# Patient Record
Sex: FEMALE | Race: WHITE | Employment: FULL TIME | URBAN - METROPOLITAN AREA
[De-identification: names, ages, dates, MRNs, and addresses within clinical notes are randomized per-mention and may not be internally consistent; named-entity substitution may affect disease eponyms.]

---

## 2017-03-03 ENCOUNTER — APPOINTMENT (OUTPATIENT)
Dept: OTHER | Facility: HOSPITAL | Age: 47
End: 2017-03-03
Attending: EMERGENCY MEDICINE

## 2017-08-08 ENCOUNTER — APPOINTMENT (OUTPATIENT)
Dept: OTHER | Facility: HOSPITAL | Age: 47
End: 2017-08-08
Attending: ORTHOPAEDIC SURGERY

## 2017-08-14 ENCOUNTER — APPOINTMENT (OUTPATIENT)
Dept: OTHER | Facility: HOSPITAL | Age: 47
End: 2017-08-14
Attending: FAMILY MEDICINE

## 2017-08-15 ENCOUNTER — APPOINTMENT (OUTPATIENT)
Dept: OTHER | Facility: HOSPITAL | Age: 47
End: 2017-08-15
Attending: ORTHOPAEDIC SURGERY

## 2020-03-19 ENCOUNTER — TELEPHONE (OUTPATIENT)
Dept: SURGERY | Facility: CLINIC | Age: 50
End: 2020-03-19

## 2020-03-19 ENCOUNTER — OFFICE VISIT (OUTPATIENT)
Dept: SURGERY | Facility: CLINIC | Age: 50
End: 2020-03-19
Payer: COMMERCIAL

## 2020-03-19 VITALS
OXYGEN SATURATION: 99 % | HEART RATE: 98 BPM | DIASTOLIC BLOOD PRESSURE: 86 MMHG | BODY MASS INDEX: 32.27 KG/M2 | HEIGHT: 64 IN | SYSTOLIC BLOOD PRESSURE: 128 MMHG | WEIGHT: 189 LBS

## 2020-03-19 DIAGNOSIS — R53.82 CHRONIC FATIGUE: ICD-10-CM

## 2020-03-19 DIAGNOSIS — R63.5 WEIGHT GAIN: ICD-10-CM

## 2020-03-19 DIAGNOSIS — E66.9 OBESITY (BMI 30-39.9): ICD-10-CM

## 2020-03-19 DIAGNOSIS — E03.9 ACQUIRED HYPOTHYROIDISM: ICD-10-CM

## 2020-03-19 DIAGNOSIS — R73.03 PRE-DIABETES: Primary | ICD-10-CM

## 2020-03-19 PROCEDURE — 99204 OFFICE O/P NEW MOD 45 MIN: CPT | Performed by: INTERNAL MEDICINE

## 2020-03-19 RX ORDER — FLUTICASONE PROPIONATE 0.05 %
CREAM (GRAM) TOPICAL AS NEEDED
COMMUNITY
Start: 2019-11-20

## 2020-03-19 RX ORDER — MOMETASONE FUROATE 200 UG/1
AEROSOL RESPIRATORY (INHALATION)
COMMUNITY
Start: 2019-10-24

## 2020-03-19 RX ORDER — FLUTICASONE FUROATE 200 UG/1
POWDER RESPIRATORY (INHALATION)
COMMUNITY
Start: 2020-01-23

## 2020-03-19 RX ORDER — LEVOTHYROXINE SODIUM 0.05 MG/1
TABLET ORAL
COMMUNITY
Start: 2020-01-29

## 2020-03-19 RX ORDER — CYCLOBENZAPRINE HCL 5 MG
TABLET ORAL AS NEEDED
COMMUNITY
Start: 2019-12-17

## 2020-03-19 NOTE — PROGRESS NOTES
[facility] Consultation Note       Patient:  Antoinette Black  :      4/15/1970  MRN:      [id number]    Referring Provider: Dr. Burden ref.  provider found       Chief Complaint:  Patient presents with:  Consult  Weight Management      S Activity      Alcohol use: Not Currently      Drug use: Never      Sexual activity: Not on file    Lifestyle      Physical activity:        Days per week: Not on file        Minutes per session: Not on file      Stress: Not on file    Relationships      So control strategies to reduce calorie intake, Identify triggers for eating and manage cues and Eat slowly and take 20 to 30 minutes to complete each meal    Exercise:  Walking  Has bike at home    ROS:  Constitutional: positive for fatigue  Respiratory: pos log.  2. Drink 48-64 ounces of non-caloric beverages per day. No fruit juices or regular soda. 3. Increase activity-upper body exercises, walk 10 minutes per day. 4. Increase fruit and vegetable servings to 5-6 per day.       A1C 6.2 (02/09/2020)    NILAYT

## 2020-04-07 ENCOUNTER — TELEPHONE (OUTPATIENT)
Dept: SURGERY | Facility: CLINIC | Age: 50
End: 2020-04-07

## 2020-04-07 NOTE — TELEPHONE ENCOUNTER
Jasiel Quach would like to increase the phentermine. She does not feel that it is helping with weight at the current dose.  Please advise

## 2020-04-08 ENCOUNTER — TELEPHONE (OUTPATIENT)
Dept: SURGERY | Facility: CLINIC | Age: 50
End: 2020-04-08

## 2020-04-08 DIAGNOSIS — Z51.81 ENCOUNTER FOR THERAPEUTIC DRUG MONITORING: Primary | ICD-10-CM

## 2020-04-08 NOTE — TELEPHONE ENCOUNTER
Spoke to patient    Has not lost any weight    Has been walking    Recommend she increases Lomaira to one tablet in the morning and one tablet at noon    She will call us back Friday with her thoughts

## 2020-04-10 ENCOUNTER — TELEPHONE (OUTPATIENT)
Dept: SURGERY | Facility: CLINIC | Age: 50
End: 2020-04-10

## 2020-04-10 RX ORDER — PHENTERMINE HYDROCHLORIDE 15 MG/1
15 CAPSULE ORAL EVERY MORNING
Qty: 30 CAPSULE | Refills: 1 | Status: SHIPPED | OUTPATIENT
Start: 2020-04-10 | End: 2020-06-17 | Stop reason: DRUGHIGH

## 2020-04-10 NOTE — TELEPHONE ENCOUNTER
Patient called to give you an update on phentermine,has not seen any change. would like to see if her dose could be increased

## 2020-04-30 ENCOUNTER — VIRTUAL PHONE E/M (OUTPATIENT)
Dept: SURGERY | Facility: CLINIC | Age: 50
End: 2020-04-30
Payer: COMMERCIAL

## 2020-04-30 VITALS — WEIGHT: 181 LBS | BODY MASS INDEX: 30.9 KG/M2 | HEIGHT: 64 IN

## 2020-04-30 DIAGNOSIS — R53.82 CHRONIC FATIGUE: ICD-10-CM

## 2020-04-30 DIAGNOSIS — E66.9 OBESITY (BMI 30-39.9): ICD-10-CM

## 2020-04-30 DIAGNOSIS — R63.2 INCREASED APPETITE: Primary | ICD-10-CM

## 2020-04-30 DIAGNOSIS — Z51.81 ENCOUNTER FOR THERAPEUTIC DRUG MONITORING: ICD-10-CM

## 2020-04-30 DIAGNOSIS — R73.03 PRE-DIABETES: ICD-10-CM

## 2020-04-30 PROCEDURE — 99213 OFFICE O/P EST LOW 20 MIN: CPT | Performed by: INTERNAL MEDICINE

## 2020-04-30 NOTE — PROGRESS NOTES
[facility], [address] [facility]  Dept: [phone]       Patient:  Sung Diaz  :      4/15/1970  MRN:      [id number]    Chief Complaint:  Patient pres Transportation needs:        Medical: Not on file        Non-medical: Not on file    Tobacco Use      Smoking status: Never Smoker      Smokeless tobacco: Never Used    Substance and Sexual Activity      Alcohol use: Not Currently      Drug use: Never Eat 3-4 cups of fresh fruits or vegetables daily    Behavior Modifications Reviewed and Discussed  Eat breakfast, Eat 3 meals per day, Plan meals in advance, Read nutrition labels, Drink 64 oz of water per day, Maintain a daily food journal, No drinking 30

## 2020-06-17 ENCOUNTER — OFFICE VISIT (OUTPATIENT)
Dept: SURGERY | Facility: CLINIC | Age: 50
End: 2020-06-17
Payer: COMMERCIAL

## 2020-06-17 VITALS
BODY MASS INDEX: 32.1 KG/M2 | SYSTOLIC BLOOD PRESSURE: 126 MMHG | HEART RATE: 98 BPM | HEIGHT: 64 IN | DIASTOLIC BLOOD PRESSURE: 76 MMHG | WEIGHT: 188 LBS | OXYGEN SATURATION: 99 %

## 2020-06-17 DIAGNOSIS — R73.03 PRE-DIABETES: Primary | ICD-10-CM

## 2020-06-17 DIAGNOSIS — Z51.81 ENCOUNTER FOR THERAPEUTIC DRUG MONITORING: ICD-10-CM

## 2020-06-17 DIAGNOSIS — R63.2 INCREASED APPETITE: ICD-10-CM

## 2020-06-17 DIAGNOSIS — E66.9 OBESITY (BMI 30-39.9): ICD-10-CM

## 2020-06-17 DIAGNOSIS — R53.82 CHRONIC FATIGUE: ICD-10-CM

## 2020-06-17 PROCEDURE — 99214 OFFICE O/P EST MOD 30 MIN: CPT | Performed by: INTERNAL MEDICINE

## 2020-06-17 RX ORDER — PHENTERMINE HYDROCHLORIDE 30 MG/1
30 CAPSULE ORAL EVERY MORNING
Qty: 30 CAPSULE | Refills: 1 | Status: SHIPPED | OUTPATIENT
Start: 2020-06-17

## 2020-06-17 RX ORDER — TOPIRAMATE 25 MG/1
25 TABLET ORAL EVERY EVENING
Qty: 30 TABLET | Refills: 2 | Status: SHIPPED | OUTPATIENT
Start: 2020-06-17

## 2020-06-17 NOTE — PROGRESS NOTES
[facility], [address] [facility]  Dept: [phone]       Patient:  Mela Mcintosh  :      4/15/1970  MRN:      [id number]    Chief Complaint:  Patient pres insecurity:        Worry: Not on file        Inability: Not on file      Transportation needs:        Medical: Not on file        Non-medical: Not on file    Tobacco Use      Smoking status: Never Smoker      Smokeless tobacco: Never Used    Substance and to 100 gms per day, Eat 100-200 calories within 1 hour of waking  and Eat 3-4 cups of fresh fruits or vegetables daily    Behavior Modifications Reviewed and Discussed  Eat breakfast, Eat 3 meals per day, Plan meals in advance, Read nutrition labels, Drink a food log. 2. Drink 48-64 ounces of non-caloric beverages per day. No fruit juices or regular soda. 3. Increase activity-upper body exercises, walk 10 minutes per day. 4. Increase fruit and vegetable servings to 5-6 per day.       Tolerating phentermine

## 2023-05-17 LAB
AMB EXT CHOL/HDL RATIO: 4.2
AMB EXT CHOLESTEROL, TOTAL: 231 MG/DL
AMB EXT HDL CHOLESTEROL: 55 MG/DL
AMB EXT HGBA1C: 6.6 %
AMB EXT LDL CHOLESTEROL, DIRECT: 142 MG/DL
AMB EXT TRIGLYCERIDES: 170 MG/DL
AMB EXT TSH: 1.89 MIU/ML
AMB EXT VLDL: 34 MG/DL

## 2023-12-19 ENCOUNTER — OFFICE VISIT (OUTPATIENT)
Facility: CLINIC | Age: 53
End: 2023-12-19
Payer: COMMERCIAL

## 2023-12-19 VITALS
OXYGEN SATURATION: 98 % | HEIGHT: 64 IN | WEIGHT: 195 LBS | HEART RATE: 103 BPM | DIASTOLIC BLOOD PRESSURE: 82 MMHG | SYSTOLIC BLOOD PRESSURE: 128 MMHG | BODY MASS INDEX: 33.29 KG/M2

## 2023-12-19 DIAGNOSIS — E03.9 HYPOTHYROIDISM, UNSPECIFIED TYPE: ICD-10-CM

## 2023-12-19 DIAGNOSIS — E11.9 TYPE 2 DIABETES MELLITUS WITHOUT COMPLICATION, WITHOUT LONG-TERM CURRENT USE OF INSULIN (HCC): ICD-10-CM

## 2023-12-19 DIAGNOSIS — Z00.00 ENCOUNTER FOR ROUTINE ADULT HEALTH EXAMINATION WITHOUT ABNORMAL FINDINGS: Primary | ICD-10-CM

## 2023-12-19 DIAGNOSIS — N95.1 HOT FLASHES DUE TO MENOPAUSE: ICD-10-CM

## 2023-12-19 PROBLEM — R63.2 INCREASED APPETITE: Status: RESOLVED | Noted: 2020-04-30 | Resolved: 2023-12-19

## 2023-12-19 PROCEDURE — 90715 TDAP VACCINE 7 YRS/> IM: CPT | Performed by: FAMILY MEDICINE

## 2023-12-19 PROCEDURE — 90471 IMMUNIZATION ADMIN: CPT | Performed by: FAMILY MEDICINE

## 2023-12-19 PROCEDURE — 99386 PREV VISIT NEW AGE 40-64: CPT | Performed by: FAMILY MEDICINE

## 2023-12-19 PROCEDURE — 99203 OFFICE O/P NEW LOW 30 MIN: CPT | Performed by: FAMILY MEDICINE

## 2023-12-19 PROCEDURE — 3079F DIAST BP 80-89 MM HG: CPT | Performed by: FAMILY MEDICINE

## 2023-12-19 PROCEDURE — 3008F BODY MASS INDEX DOCD: CPT | Performed by: FAMILY MEDICINE

## 2023-12-19 PROCEDURE — 3074F SYST BP LT 130 MM HG: CPT | Performed by: FAMILY MEDICINE

## 2023-12-19 RX ORDER — TIRZEPATIDE 5 MG/.5ML
INJECTION, SOLUTION SUBCUTANEOUS
COMMUNITY
End: 2023-12-20 | Stop reason: DRUGHIGH

## 2023-12-19 RX ORDER — FLUTICASONE PROPIONATE 220 UG/1
AEROSOL, METERED RESPIRATORY (INHALATION) 2 TIMES DAILY
COMMUNITY
End: 2023-12-22

## 2023-12-19 RX ORDER — YOHIMBE BARK 500 MG
500 CAPSULE ORAL 3 TIMES DAILY
COMMUNITY

## 2023-12-20 RX ORDER — TIRZEPATIDE 7.5 MG/.5ML
7.5 INJECTION, SOLUTION SUBCUTANEOUS WEEKLY
Qty: 2 ML | Refills: 0 | Status: SHIPPED | OUTPATIENT
Start: 2023-12-20

## 2023-12-22 RX ORDER — FLUTICASONE PROPIONATE 220 UG/1
1 AEROSOL, METERED RESPIRATORY (INHALATION) 2 TIMES DAILY
Qty: 3 EACH | Refills: 3 | Status: SHIPPED | OUTPATIENT
Start: 2023-12-22 | End: 2023-12-22

## 2023-12-22 RX ORDER — FLUTICASONE PROPIONATE 220 UG/1
1 AEROSOL, METERED RESPIRATORY (INHALATION) 2 TIMES DAILY
Qty: 3 EACH | Refills: 3 | Status: SHIPPED | OUTPATIENT
Start: 2023-12-22

## 2023-12-22 NOTE — TELEPHONE ENCOUNTER
Refill passed per [facility] Brian protocol. Rx listed as external/Pt reported    Requested Prescriptions   Pending Prescriptions Disp Refills    Fluticasone Propionate  MCG/ACT Inhalation Aerosol 3 each 3     Sig: Inhale into the lungs 2 (two) times daily. Inhale into the lungs 2 (two) times daily.        Asthma & COPD Medication Protocol Passed - 12/22/2023 12:28 PM        Passed - In person appointment or virtual visit in the past 6 mos or appointment in next 3 mos     Recent Outpatient Visits              3 days ago Encounter for routine adult health examination without abnormal findings    Adri Hidalgo [address]    Office Visit    3 years ago Shira Christy MD    Office Visit    3 years ago Increased appetite    Maikol Pérez MD    [facility] [employer] [facility]    3 years ago Pre-diabetes    Maikol Pérez MD    Office Visit          Future Appointments         Provider Department Appt Notes    In 2 months Kelton Yepez, [address], [facility] [place] Visits              3 days ago Encounter for routine adult health examination without abnormal findings    [address] [place] [address]    Office Visit    3 years ago Pre-diabetes    Maikol Pérez MD    Office Visit    3 years ago Increased appetite    [address], [address] [facility] [address], Kristian Pugh MD    [facility]    3 years ago Shira Christy MD    Office Visit            Future Appointments         Provider Department Appt Notes    In 2 months Kelton Yepez, [facility]

## 2023-12-22 NOTE — TELEPHONE ENCOUNTER
Patient is calling requesting refill on the following medication.     Fluticasone Propionate  MCG/ACT Inhalation Aerosol         [id number] - [facility], [phone], [phone]

## 2024-01-16 ENCOUNTER — TELEPHONE (OUTPATIENT)
Facility: CLINIC | Age: 54
End: 2024-01-16

## 2024-01-16 DIAGNOSIS — E11.9 TYPE 2 DIABETES MELLITUS WITHOUT COMPLICATION, WITHOUT LONG-TERM CURRENT USE OF INSULIN (HCC): ICD-10-CM

## 2024-01-16 RX ORDER — TIRZEPATIDE 7.5 MG/.5ML
7.5 INJECTION, SOLUTION SUBCUTANEOUS WEEKLY
Qty: 2 ML | Refills: 0 | Status: CANCELLED | OUTPATIENT
Start: 2024-01-16

## 2024-01-16 RX ORDER — TIRZEPATIDE 10 MG/.5ML
10 INJECTION, SOLUTION SUBCUTANEOUS WEEKLY
Qty: 2 ML | Refills: 0 | Status: SHIPPED | OUTPATIENT
Start: 2024-01-16 | End: 2024-01-20

## 2024-01-16 NOTE — TELEPHONE ENCOUNTER
Mounjaro 10 mg weekly dose sent, and should let me know prior to needing refills if she wants to increase the dose.

## 2024-01-16 NOTE — TELEPHONE ENCOUNTER
Contacted patient and advised.    States she also needs a substitute inhaler for Fluticasone Propionate. Reports insurance will no longer cover it. She has tried Asmanex, Arnuity ellipta in the past, it did not help.

## 2024-01-16 NOTE — TELEPHONE ENCOUNTER
Incoming call from patient requesting a refill for Tirzepatide (MOUNJARO) 7.5 MG/0.5ML Subcutaneous Solution Pen-injector  But will like to increase her medication to 10 MG .    Please advise .    If medication is approve please send to [facility] #[id number] - [facility] [address] [facility], [phone], [phone]

## 2024-01-17 NOTE — TELEPHONE ENCOUNTER
Qvar sent, and to start with 1 puff twice daily but can increase to 2 puffs twice daily if needed.

## 2024-01-20 RX ORDER — TIRZEPATIDE 10 MG/.5ML
10 INJECTION, SOLUTION SUBCUTANEOUS WEEKLY
Qty: 2 ML | Refills: 0 | Status: SHIPPED | OUTPATIENT
Start: 2024-01-20

## 2024-01-20 NOTE — TELEPHONE ENCOUNTER
Patient called regarding refill.   Name and  verified.    States that mounjaro was supposed to be sent to [facility].    Resent original rx per patient request.

## 2024-02-13 DIAGNOSIS — E11.9 TYPE 2 DIABETES MELLITUS WITHOUT COMPLICATION, WITHOUT LONG-TERM CURRENT USE OF INSULIN (HCC): Primary | ICD-10-CM

## 2024-02-13 RX ORDER — TIRZEPATIDE 12.5 MG/.5ML
12.5 INJECTION, SOLUTION SUBCUTANEOUS WEEKLY
Qty: 2 ML | Refills: 0 | Status: SHIPPED | OUTPATIENT
Start: 2024-02-13

## 2024-02-13 NOTE — TELEPHONE ENCOUNTER
Patient requesting next dose on mounjaro. No protocol for requested medication.  Please advise on refill request.      Recent Outpatient Visits              1 month ago Encounter for routine adult health examination without abnormal findings    [facility] Clair Sauceda DO    Office Visit    3 years ago [facility]-di[facility]    [facility]    3 years ago Increased appetite    [facility]    Virtual [facility]M    3 years ago Pre-diabetes    [facility]    [facility]          Future Appointments         Provider Department Appt Notes    In 1 month Clair Sauceda DO [facility]

## 2024-03-12 DIAGNOSIS — E11.9 TYPE 2 DIABETES MELLITUS WITHOUT COMPLICATION, WITHOUT LONG-TERM CURRENT USE OF INSULIN (HCC): Primary | ICD-10-CM

## 2024-03-12 RX ORDER — TIRZEPATIDE 15 MG/.5ML
0.5 INJECTION, SOLUTION SUBCUTANEOUS WEEKLY
Qty: 6 ML | Refills: 0 | Status: SHIPPED | OUTPATIENT
Start: 2024-03-12

## 2024-03-12 NOTE — TELEPHONE ENCOUNTER
The patient is calling for a refill. She needs the 15 mg. She states today is the last day for the 12.5 mg    Tirzepatide (MOUNJARO) 12.5 MG/0.5ML Subcutaneous Solution Pen-injector . She says she needs the 15mg now instead of 12.5 mg

## 2024-03-19 ENCOUNTER — OFFICE VISIT (OUTPATIENT)
Facility: CLINIC | Age: 54
End: 2024-03-19
Payer: COMMERCIAL

## 2024-03-19 VITALS
WEIGHT: 188 LBS | DIASTOLIC BLOOD PRESSURE: 74 MMHG | SYSTOLIC BLOOD PRESSURE: 128 MMHG | HEART RATE: 108 BPM | BODY MASS INDEX: 32.1 KG/M2 | OXYGEN SATURATION: 99 % | HEIGHT: 64 IN

## 2024-03-19 DIAGNOSIS — R73.03 PRE-DIABETES: ICD-10-CM

## 2024-03-19 DIAGNOSIS — Z00.00 ROUTINE GENERAL MEDICAL EXAMINATION AT A HEALTH CARE FACILITY: ICD-10-CM

## 2024-03-19 DIAGNOSIS — R21 RASH AND NONSPECIFIC SKIN ERUPTION: ICD-10-CM

## 2024-03-19 DIAGNOSIS — E66.9 OBESITY (BMI 30-39.9): Primary | ICD-10-CM

## 2024-03-19 DIAGNOSIS — E03.9 HYPOTHYROIDISM, UNSPECIFIED TYPE: ICD-10-CM

## 2024-03-19 DIAGNOSIS — Z12.11 COLON CANCER SCREENING: ICD-10-CM

## 2024-03-19 PROCEDURE — 99214 OFFICE O/P EST MOD 30 MIN: CPT | Performed by: FAMILY MEDICINE

## 2024-03-19 RX ORDER — TIRZEPATIDE 15 MG/.5ML
15 INJECTION, SOLUTION SUBCUTANEOUS WEEKLY
Qty: 2 ML | Refills: 5 | Status: SHIPPED | OUTPATIENT
Start: 2024-03-19

## 2024-03-19 RX ORDER — CETIRIZINE HYDROCHLORIDE 10 MG/1
10 TABLET ORAL DAILY
COMMUNITY

## 2024-03-19 RX ORDER — TRIAMCINOLONE ACETONIDE 1 MG/G
1 OINTMENT TOPICAL 2 TIMES DAILY
Qty: 80 G | Refills: 1 | Status: SHIPPED | OUTPATIENT
Start: 2024-03-19

## 2024-03-19 NOTE — PROGRESS NOTES
CC:    Chief Complaint   Patient presents with    Follow - Up     Medication follow up       HPI: 53 year old female here to follow-up on medication for Type 2 diabetes.  She was supposed to  her Mounjaro today, but she was just told they are out of it nationwide.  She was told all of the higher doses are out right now, but she had no issues with getting it up until now.  She has been on Mounjaro consistently for the past 6 months.   Denies any significant side effects with the medication other than occasional constipation or diarrhea.   Has continued to take Metformin once daily without issues as well.   Has been trying to eat healthier and eats foods in moderation.   Does feel quinteros faster, but not noticing much change in her cravings.   She was switched to Qvar from Flovent due to Flovent being discontinued, but still has Flovent left for now.   Has been breaking out in red bumps more recently, and using OTC cortisone for it.  Seeing them more on her upper body.   This rash is itchy.   Using fragrant free soaps and detergents.      ROS:  General:  No fever, no fatigue, intentional weight loss, decreased appetite  HEENT:  Denies congestion or nasal discharge, no vision changes   Cardio:  No chest pain  Pulmonary:  No cough, no SOB  GI:  No N/V/D, intermittent constipation, no hematochezia   :  No discharge, no dysuria, no polyuria, no hematuria  Dermatologic:  Frequent red bumps     Past Medical History:   Diagnosis Date    Anxiety     Asthma (HCC)     Hypothyroidism     Obesity (BMI 30-39.9)     Pre-diabetes     Vitamin D deficiency        Social History     Socioeconomic History    Marital status: Single     Spouse name: Not on file    Number of children: Not on file    Years of education: Not on file    Highest education level: Not on file   Occupational History    Not on file   Tobacco Use    Smoking status: Never    Smokeless tobacco: Never   Vaping Use    Vaping Use: Never used   Substance and  Sexual Activity    Alcohol use: Not Currently    Drug use: Never    Sexual activity: Not Currently     Partners: Male   Other Topics Concern    Not on file   Social History Narrative    Not on file     Social Determinants of Health     Financial Resource Strain: Not on file   Food Insecurity: Not on file   Transportation Needs: Not on file   Physical Activity: Not on file   Stress: Not on file   Social Connections: Not on file   Housing Stability: Not on file       Current Outpatient Medications   Medication Sig Dispense Refill    Tirzepatide (MOUNJARO) 15 MG/0.5ML Subcutaneous Solution Pen-injector Inject 0.5 mL into the skin once a week. 6 mL 0    Beclomethasone Diprop HFA 80 MCG/ACT Inhalation Aerosol, Breath Activated Inhale 1 puff into the lungs in the morning and 1 puff before bedtime. 10.6 g 2    metFORMIN 500 MG Oral Tab Take 1 tablet (500 mg total) by mouth daily with breakfast.      Levothyroxine Sodium 50 MCG Oral Tab Take 1 tablet (50 mcg total) by mouth After Breakfast.      Fluticasone Propionate 0.05 % External Cream as needed.         Aspirin      Vitals:   Vitals:    03/19/24 0826   BP: 128/74   Pulse: 108   SpO2: 99%   Weight: 188 lb (85.3 kg)   Height: 5' 4\" (1.626 m)       Body mass index is 32.27 kg/m².    Physical:  General:  Alert, appropriate, no acute distress   HEENT: supple, no lymphadenopathy   Cardio:  RRR, no murmurs, S1, S2  Dermatologic: Erythematous patches on the chest wall and left arm    Assessment and Plan: 53 year old female here to follow-up on prediabetes, hypothyroidism, and discuss rash.     1. Obesity (BMI 30-39.9)    - Will trial Zepbound 15 mg weekly as she has remained in the prediabetes range and is no longer able to get Mounjaro due to supply issues  - No personal history of pancreatitis and no personal or family history of medullary thyroid cancer or MEN syndrome Type 2  - Follow-up in 3-6 months   - Tirzepatide-Weight Management (ZEPBOUND) 15 MG/0.5ML Subcutaneous  Solution Auto-injector; Inject 15 mg into the skin once a week.  Dispense: 2 mL; Refill: 5    2. Pre-diabetes    - Trial Zepbound as above and repeat A1C  - Can stop Metformin once able to resume Tirzepatide 15 mg weekly   - Also recommend eye exam and referral given  - Tirzepatide-Weight Management (ZEPBOUND) 15 MG/0.5ML Subcutaneous Solution Auto-injector; Inject 15 mg into the skin once a week.  Dispense: 2 mL; Refill: 5  - HGB A1C (Glycohemoglobin) [496] [Q]  - Ophthalmology Referral - In Network    3. Hypothyroidism, unspecified type    - Due for repeat TSH  - TSH W REFLEX TO FREE T4 [06583][Q]    4. Rash and nonspecific skin eruption    - Will trial triamcinolone ointment twice daily x 2 weeks for possible eczema  - Also to switch to Zyrtec 10 mg daily    5. Colon cancer screening    - Cologuard order sent today per patient request    6. Routine general medical examination at a health care facility    - Due for annual labs   - COMP METABOLIC PANEL [16888] [Q]  - CBC [6399] [Q]  - HCV ANTIBODY [8472] [Q]  - MICROALB/CREAT RATIO, RANDOM URINE [8717] [Q]    Clair Sauceda DO  03/19/24  8:36 AM

## 2024-03-21 ENCOUNTER — TELEPHONE (OUTPATIENT)
Facility: CLINIC | Age: 54
End: 2024-03-21

## 2024-03-21 NOTE — TELEPHONE ENCOUNTER
Incoming call from patient to inform Zepbound RX is to expensive for her even with her insurance . If  can recommend a alternative .  Please advice .

## 2024-03-21 NOTE — TELEPHONE ENCOUNTER
Please review. Protocol Failed or has No Protocol.    Medications listed as external. Please advise. Thanks.    Requested Prescriptions   Pending Prescriptions Disp Refills    levothyroxine 50 MCG Oral Tab 90 tablet 3     Sig: Take 1 tablet (50 mcg total) by mouth After Breakfast.       Thyroid Medication Protocol Passed - 3/19/2024  4:54 PM        Passed - TSH in past 12 months        Passed - Last TSH value is normal     Lab Results   Component Value Date    TSH 1.890 05/17/2023                 Passed - In person appointment or virtual visit in the past 12 mos or appointment in next 3 mos     Recent Outpatient Visits              2 days ago Obesity (BMI 30-39.9)    [facility] Clair Sauceda DO    Office Visit    3 months ago Encounter for routine adult health examination without abnormal findings    [facility] Clair Sauceda [facility]    Office Visit    3 years ago Pre-diabetes    [facility]    Office Visit    3 years ago Increased appetite    [facility]    Virtual Phone E/M    4 years ago Pre-diabetes    [facility]    [facility] Visit          Future Appointments         Provider Department Appt Notes    In 3 months Clair Sauceda DO [facility]                  metFORMIN 500 MG Oral Tab 90 tablet 3     Sig: Take 1 tablet (500 mg total) by mouth daily with breakfast.       Diabetes Medication Protocol Failed - 3/19/2024  4:54 PM        Failed - Last A1C < 7.5 and within past 6 months     Lab Results   Component Value Date    A1C 6.6 05/17/2023             Failed - Microalbumin procedure in past 12 months or taking ACE/ARB        Failed - EGFRCR or GFRNAA > 50     GFR Evaluation            Failed - GFR in the past 12 months         Passed - In person appointment or virtual visit in the past 6 mos or appointment in next 3 mos     Recent Outpatient Visits              2 days ago Obesity (BMI 30-39.9)    [facility] Clair Sauceda DO    Office Visit    3 months ago Encounter for routine adult health examination without abnormal findings    [facility] Clair Sauceda DO    Office Visit    3 years ago Pre-diabetes    [facility]    [facility]    3 years ago Increased appetite    [facility]    Virtual Phone E/M    4 years ago [facility]-diabetes    [facility]    [facility]          Future Appointments         Provider Department Appt Notes    In 3 months Clair Sauceda DO [facility]                     Recent Outpatient Visits              2 days ago Obesity (BMI 30-39.9)    [facility] Cliar Sauceda DO    Office Visit    3 months ago Encounter for routine adult health examination without abnormal findings    [facility] Clair Sauceda DO    Office Visit    3 years ago Pre-diabetes    [facility]    Office Visit    3 years ago Increased appetite    [facility]    Virtual Phone E/M    4 years ago [facility]di[facility]    [facility]            Future Appointments         Provider Department Appt Notes    In 3 months Clair Sauceda DO [facility]

## 2024-03-22 RX ORDER — LEVOTHYROXINE SODIUM 0.05 MG/1
50 TABLET ORAL
Qty: 90 TABLET | Refills: 0 | Status: SHIPPED | OUTPATIENT
Start: 2024-03-22 | End: 2024-03-25

## 2024-03-25 ENCOUNTER — TELEPHONE (OUTPATIENT)
Facility: CLINIC | Age: 54
End: 2024-03-25

## 2024-03-25 RX ORDER — LEVOTHYROXINE SODIUM 0.05 MG/1
50 TABLET ORAL
Qty: 90 TABLET | Refills: 0 | Status: SHIPPED | OUTPATIENT
Start: 2024-03-25

## 2024-03-25 NOTE — TELEPHONE ENCOUNTER
Dr. Sauceda, please see fax below and advise on sig.  Should patient be taking levothyroxine after breakfast or prior. Thanks.

## 2024-03-25 NOTE — TELEPHONE ENCOUNTER
She should take first thing in the morning with a sip of water on an empty stomach (without any other meds or supplements), and must wait 30-60 minutes afterwards to eat. Thank you.

## 2024-03-26 NOTE — TELEPHONE ENCOUNTER
Please review; protocol failed/No Protocol    [facility] is requesting clarification on directions. Previous prescriber had daily with dinner, and we have daily with breakfast, which is correct?     Requested Prescriptions   Pending Prescriptions Disp Refills    metFORMIN 500 MG Oral Tab 90 tablet 3     Sig: Take 1 tablet (500 mg total) by mouth daily with breakfast.       Diabetes Medication Protocol Failed - 3/25/2024 12:08 PM        Failed - Last A1C < 7.5 and within past 6 months     Lab Results   Component Value Date    A1C 6.6 05/17/2023             Failed - Microalbumin procedure in past 12 months or taking ACE/ARB        Failed - EGFRCR or GFRNAA > 50     GFR Evaluation            Failed - GFR in the past 12 months        Passed - In person appointment or virtual visit in the past 6 mos or appointment in next 3 mos     Recent Outpatient Visits              1 week ago Obesity (BMI 30-39.9)    [facility] Clair Sauceda DO    Office Visit    3 months ago Encounter for routine adult health examination without abnormal findings    [facility] Clair Sauceda DO    Office Visit    3 years ago Pre-diabetes    [facility]    Office Visit    3 years ago Increased appetite    [facility] Nirali Mosley Omar, MD    Virtual Phone E/M    4 years ago Pre-diabetes    [facility] Nirali Mosley Omar, MD    Office Visit          Future Appointments         Provider Department Appt Notes    In 3 months Clair Sauceda DO [facility]                   Future Appointments         Provider Department Appt Notes    In 3 months Clair Sauceda DO [facility]           Recent Outpatient Visits              1 week ago Obesity (BMI 30-39.9)     Problem:  Infant, Late or Early Term  Goal: Signs and Symptoms of Listed Potential Problems Will be Absent or Manageable ( Infant, Late or Early Term)  Outcome: Ongoing (interventions implemented as appropriate)    Problem: Patient Care Overview (Infant)  Goal: Plan of Care Review  Outcome: Ongoing (interventions implemented as appropriate)    17 1550   Coping/Psychosocial Response   Care Plan Reviewed With mother;father   Patient Care Overview   Progress progress toward functional goals as expected   Outcome Evaluation   Outcome Summary/Follow up Plan Temp, heart rate, o2 sat WNL, RR as high as 80's at times today. NICU reassessed pt and ordered CBC which came back WNL. Baby allowed to go back to room and continue close monitoriing. Continues to feed well, void and stool,        Goal: Infant Individualization and Mutuality  Outcome: Ongoing (interventions implemented as appropriate)  Goal: Discharge Needs Assessment  Outcome: Ongoing (interventions implemented as appropriate)       [facility] Clair Sauceda DO    Office Visit    3 months ago Encounter for routine adult health examination without abnormal findings    [facility] Clair Sauceda DO    Office Visit    3 years ago Pre-diabetes    [facility]    Office Visit    3 years ago Increased appetite    [facility]    Virtual Phone E/M    4 years ago Pre-diabetes    [facility]    Office Visit

## 2024-04-03 ENCOUNTER — TELEPHONE (OUTPATIENT)
Facility: CLINIC | Age: 54
End: 2024-04-03

## 2024-04-03 DIAGNOSIS — E11.9 TYPE 2 DIABETES MELLITUS WITHOUT COMPLICATION, WITHOUT LONG-TERM CURRENT USE OF INSULIN (HCC): Primary | ICD-10-CM

## 2024-04-03 DIAGNOSIS — E66.9 OBESITY (BMI 30-39.9): ICD-10-CM

## 2024-04-03 DIAGNOSIS — R73.03 PRE-DIABETES: ICD-10-CM

## 2024-04-03 RX ORDER — TIRZEPATIDE 15 MG/.5ML
15 INJECTION, SOLUTION SUBCUTANEOUS WEEKLY
Qty: 2 ML | Refills: 5 | Status: CANCELLED | OUTPATIENT
Start: 2024-04-03

## 2024-04-03 NOTE — TELEPHONE ENCOUNTER
Patient is requesting a 3 month supply/ script be sent for the following medication.      MOUNJARO 15mg    [facility] - [address] [phone], [phone]

## 2024-04-05 LAB — AMB EXT COLOGUARD RESULT: NEGATIVE

## 2024-04-05 RX ORDER — TIRZEPATIDE 15 MG/.5ML
15 INJECTION, SOLUTION SUBCUTANEOUS WEEKLY
Qty: 6 ML | Refills: 0 | Status: SHIPPED | OUTPATIENT
Start: 2024-04-05

## 2024-04-05 NOTE — TELEPHONE ENCOUNTER
Spoke with patient (name/ confirmed) and informed patient of Dr. Sauceda instructions/recommendations.  Patient verbalizes understanding and agrees to the plan of care.  Patient had no further questions at this time

## 2024-04-05 NOTE — TELEPHONE ENCOUNTER
Left message to call back.  Pt requesting Mounjaro, Dr changed to Zepbound 3/19/24 sent to local pharmacy      Tirzepatide-Weight Management (ZEPBOUND) 15 MG/0.5ML Subcutaneous Solution Auto-injector  Inject 15 mg into the skin once a week. Dispense: 2 mL, Refills: 5 ordered       03/19/2024 -- I AM AT [facility]UG STORE #[id number] [address], [phone], [phone] --  -- -- Report        Dose, Route, Frequency: 15 mg, Subcutaneous, WeeklyDx Associated: Taking: Long-term: Ordered Date & Time: 03/19/2024 0851Start Date & Time: 03/19/2024             Note to Pharmacy: This is instead of Mounjaro

## 2024-04-05 NOTE — TELEPHONE ENCOUNTER
Mounjaro sent, but doubt it will be available. Also, she is overdue for labs so please urge her to do this ASAP.

## 2024-04-05 NOTE — TELEPHONE ENCOUNTER
Patient calling ( identified name and  ) states the Zepbound is $600 with the coupon, she cannot afford this     Asking to try Mounjaro and send  to  [facility] for a 3 month supply     Please advise and thank you.    [facility] - [address] [phone], [phone] [[id number]]

## 2024-04-10 ENCOUNTER — TELEPHONE (OUTPATIENT)
Facility: CLINIC | Age: 54
End: 2024-04-10

## 2024-05-18 LAB
ABSOLUTE BASOPHILS: 81 CELLS/UL (ref 0–200)
ABSOLUTE EOSINOPHILS: 126 CELLS/UL (ref 15–500)
ABSOLUTE LYMPHOCYTES: 2997 CELLS/UL (ref 850–3900)
ABSOLUTE MONOCYTES: 576 CELLS/UL (ref 200–950)
ABSOLUTE NEUTROPHILS: 5220 CELLS/UL (ref 1500–7800)
ALBUMIN/GLOBULIN RATIO: 1.3 (CALC) (ref 1–2.5)
ALBUMIN: 4.3 G/DL (ref 3.6–5.1)
ALKALINE PHOSPHATASE: 83 U/L (ref 37–153)
ALT: 16 U/L (ref 6–29)
AST: 14 U/L (ref 10–35)
BASOPHILS: 0.9 %
BILIRUBIN, TOTAL: 0.3 MG/DL (ref 0.2–1.2)
BUN: 14 MG/DL (ref 7–25)
CALCIUM: 10 MG/DL (ref 8.6–10.4)
CARBON DIOXIDE: 25 MMOL/L (ref 20–32)
CHLORIDE: 104 MMOL/L (ref 98–110)
CREATININE, RANDOM URINE: 65 MG/DL (ref 20–275)
CREATININE: 0.92 MG/DL (ref 0.5–1.03)
EGFR: 74 ML/MIN/1.73M2
EOSINOPHILS: 1.4 %
GLOBULIN: 3.2 G/DL (CALC) (ref 1.9–3.7)
GLUCOSE: 83 MG/DL (ref 65–99)
HEMATOCRIT: 43.8 % (ref 35–45)
HEMOGLOBIN A1C: 6.2 % OF TOTAL HGB
HEMOGLOBIN: 13.9 G/DL (ref 11.7–15.5)
LYMPHOCYTES: 33.3 %
MCH: 27.6 PG (ref 27–33)
MCHC: 31.7 G/DL (ref 32–36)
MCV: 87.1 FL (ref 80–100)
MICROALBUMIN/CREATININE RATIO, RANDOM URINE: 3 MG/G CREAT
MICROALBUMIN: 0.2 MG/DL
MONOCYTES: 6.4 %
MPV: 11 FL (ref 7.5–12.5)
NEUTROPHILS: 58 %
PLATELET COUNT: 393 THOUSAND/UL (ref 140–400)
POTASSIUM: 4.7 MMOL/L (ref 3.5–5.3)
PROTEIN, TOTAL: 7.5 G/DL (ref 6.1–8.1)
RDW: 14.9 % (ref 11–15)
RED BLOOD CELL COUNT: 5.03 MILLION/UL (ref 3.8–5.1)
SODIUM: 137 MMOL/L (ref 135–146)
T4, FREE: 1 NG/DL (ref 0.8–1.8)
TSH W/REFLEX TO FT4: 6.88 MIU/L
WHITE BLOOD CELL COUNT: 9 THOUSAND/UL (ref 3.8–10.8)

## 2024-05-24 ENCOUNTER — TELEPHONE (OUTPATIENT)
Facility: CLINIC | Age: 54
End: 2024-05-24

## 2024-05-24 DIAGNOSIS — E03.9 HYPOTHYROIDISM, UNSPECIFIED TYPE: Primary | ICD-10-CM

## 2024-05-24 RX ORDER — LEVOTHYROXINE SODIUM 0.07 MG/1
75 TABLET ORAL
Qty: 90 TABLET | Refills: 0 | Status: SHIPPED | OUTPATIENT
Start: 2024-05-24

## 2024-05-28 ENCOUNTER — TELEPHONE (OUTPATIENT)
Facility: CLINIC | Age: 54
End: 2024-05-28

## 2024-05-28 DIAGNOSIS — E03.9 HYPOTHYROIDISM, UNSPECIFIED TYPE: Primary | ICD-10-CM

## 2024-05-28 RX ORDER — LEVOTHYROXINE SODIUM 0.1 MG/1
100 TABLET ORAL
Qty: 90 TABLET | Refills: 0 | Status: SHIPPED | OUTPATIENT
Start: 2024-05-28

## 2024-05-28 RX ORDER — LEVOTHYROXINE SODIUM 0.1 MG/1
100 TABLET ORAL
Qty: 90 TABLET | Refills: 0 | Status: SHIPPED | OUTPATIENT
Start: 2024-05-28 | End: 2024-05-28

## 2024-05-28 NOTE — TELEPHONE ENCOUNTER
, please advise.     Patient called stating her levothyroxine is the wrong dose.     Patient states her former doctor,  prescribed 100 mcg of levothyroxine daily.    Patient  states she had been taking levothyroxine 100 mcg all along for last couple of years.     Patient's medication that was prescribed in  March was 50 mcg and then was increased to 75 mcg.     Patient states she has not filled the 75 mcg because she should be taking 100 mcg., but now has been taking 50mcg since March 2024

## 2024-05-28 NOTE — TELEPHONE ENCOUNTER
Thanks for clarifying. Levothyroxine 100 mcg daily sent, and please have her repeat the TSH in 6 weeks.

## 2024-05-28 NOTE — TELEPHONE ENCOUNTER
Spoke to patient and she asked that we resend the 100 mcgs Levothyroxine to her mail order as it's cheaper. She will double up on her 50 mcg dose in the meantime. I resent to her [facility]

## 2024-06-13 NOTE — TELEPHONE ENCOUNTER
REFILL PASSED PER [facility] PROTOCOLS    Requested Prescriptions   Pending Prescriptions Disp Refills    METFORMIN 500 MG Oral Tab [Pharmacy Med Name: metFORMIN HCl 500 MG Oral Tablet] 90 tablet 3     Sig: TAKE 1 TABLET BY MOUTH DAILY  WITH BREAKFAST       Diabetes Medication Protocol Passed - 6/10/2024  4:15 AM        Passed - Last A1C < 7.5 and within past 6 months     Lab Results   Component Value Date    A1C 6.2 (H) 05/17/2024             Passed - In person appointment or virtual visit in the past 6 mos or appointment in next 3 mos     Recent Outpatient Visits              2 months ago Obesity (BMI 30-39.9)    [facility], [facility] Clair Sauceda DO    Office Visit    5 months ago Encounter for routine adult health examination without abnormal findings    [facility] Clair Sauceda DO    Office Visit    3 years ago Pre-diabetes    [facility]    [facility] Visit    4 years ago Increased appetite    [facility]    Virtual Phone E/M    4 years ago Pre-diabetes    [facility]    [facility]          Future Appointments         Provider Department Appt Notes    In 2 weeks Clair Sauceda DO [facility]                     Passed - Microalbumin procedure in past 12 months or taking ACE/ARB        Passed - EGFRCR or GFRNAA > 50     GFR Evaluation  EGFRCR: 74 , resulted on 5/17/2024          Passed - GFR in the past 12 months             Future Appointments         Provider Department Appt Notes    In 2 weeks Clair Sauceda DO [facility], [facility]           Recent Outpatient Visits              2 months ago Obesity (BMI 30-39.9)    [facility] Clair Sauceda DO     Office Visit    5 months ago Encounter for routine adult health examination without abnormal findings    [facility] Clair Sauceda DO    Office Visit    3 years ago Pre-diabetes    [facility]    Office Visit    4 years ago Increased appetite    [facility]    Virtual Phone E/M    4 years ago Pre-diabetes    [facility]    Office Visit

## 2024-07-02 ENCOUNTER — OFFICE VISIT (OUTPATIENT)
Facility: CLINIC | Age: 54
End: 2024-07-02
Payer: COMMERCIAL

## 2024-07-02 VITALS
HEIGHT: 64 IN | DIASTOLIC BLOOD PRESSURE: 82 MMHG | HEART RATE: 98 BPM | OXYGEN SATURATION: 100 % | SYSTOLIC BLOOD PRESSURE: 120 MMHG | WEIGHT: 180 LBS | BODY MASS INDEX: 30.73 KG/M2

## 2024-07-02 DIAGNOSIS — E03.9 HYPOTHYROIDISM, UNSPECIFIED TYPE: ICD-10-CM

## 2024-07-02 DIAGNOSIS — J45.909 PERSISTENT ASTHMA WITHOUT COMPLICATION, UNSPECIFIED ASTHMA SEVERITY (HCC): ICD-10-CM

## 2024-07-02 DIAGNOSIS — E11.9 TYPE 2 DIABETES MELLITUS WITHOUT COMPLICATION, WITHOUT LONG-TERM CURRENT USE OF INSULIN (HCC): Primary | ICD-10-CM

## 2024-07-02 PROCEDURE — 99214 OFFICE O/P EST MOD 30 MIN: CPT | Performed by: FAMILY MEDICINE

## 2024-07-02 RX ORDER — FEXOFENADINE HCL 180 MG/1
180 TABLET ORAL DAILY
COMMUNITY

## 2024-07-02 NOTE — PROGRESS NOTES
CC:    Chief Complaint   Patient presents with    Follow - Up       HPI: 54 year old female here to follow-up on hypothyroidism and Type 2 diabetes.  Has been on Levothyroxine 100 mcg daily for several years.   Then was taking 50 mcg daily for two months from March until May as she did not know she had the wrong dose.  She was feeling really tired and her hair was falling out as well.  Increased the Levothyroxine back up to 100 mcg daily at the end of May, and feels much better.  Has been on Mounjaro 15 mg weekly since March, and feels she is tolerating it well overall.  She does feel less hungry, and does not snack at night either.   Will occasionally have constipation or diarrhea with it, but manages it with drinking more fluids.   Has also continued to take Metformin once daily.  She is worried she will lose her job soon and also lose her insurance, which means she would not be able to take Mounjaro anymore.    Her asthma does flare-up more with weather changes and due to allergies, but has been trying to manage it as best as she can.     ROS:  General:  No fever, no fatigue, intentional weight loss, decreased appetite   HEENT:  Intermittent congestion and nasal discharge, no blurry vision or vision changes  Cardio:  No chest pain  Pulmonary:  No cough, no SOB, no wheezing   GI:  No N/V, intermittent constipation and diarrhea   Dermatologic:  No rashes, hair loss     Past Medical History:    Anxiety    Asthma (HCC)    Hypothyroidism    Obesity (BMI 30-39.9)    Pre-diabetes    Vitamin D deficiency       Social History     Socioeconomic History    Marital status:      Spouse name: Not on file    Number of children: Not on file    Years of education: Not on file    Highest education level: Not on file   Occupational History    Not on file   Tobacco Use    Smoking status: Never    Smokeless tobacco: Never   Vaping Use    Vaping status: Never Used   Substance and Sexual Activity    Alcohol use: Not Currently     Drug use: Never    Sexual activity: Not Currently     Partners: Male   Other Topics Concern    Not on file   Social History Narrative    Not on file     Social Determinants of Health     Financial Resource Strain: Not on file   Food Insecurity: Not on file   Transportation Needs: Not on file   Physical Activity: Not on file   Stress: Not on file   Social Connections: Not on file   Housing Stability: Not on file       Current Outpatient Medications   Medication Sig Dispense Refill    fexofenadine 180 MG Oral Tab Take 1 tablet (180 mg total) by mouth daily.      metFORMIN 500 MG Oral Tab Take 1 tablet (500 mg total) by mouth daily with breakfast. 90 tablet 3    levothyroxine 100 MCG Oral Tab Take 1 tablet (100 mcg total) by mouth before breakfast. On empty stomach 30-60 minutes before eating 90 tablet 0    Tirzepatide (MOUNJARO) 15 MG/0.5ML Subcutaneous Solution Pen-injector Inject 15 mg into the skin once a week. 6 mL 0    triamcinolone 0.1 % External Ointment Apply 1 Application topically 2 (two) times daily. Use for twice daily x 2 weeks to red, itchy bumps and then stop use for one week. 80 g 1    Beclomethasone Diprop HFA 80 MCG/ACT Inhalation Aerosol, Breath Activated Inhale 1 puff into the lungs in the morning and 1 puff before bedtime. 10.6 g 2       Aspirin      Vitals:   Vitals:    07/02/24 0756   BP: 120/82   Pulse: 98   SpO2: 100%   Weight: 180 lb (81.6 kg)   Height: 5' 4\" (1.626 m)     Wt Readings from Last 6 Encounters:   07/02/24 180 lb (81.6 kg)   03/19/24 188 lb (85.3 kg)   12/19/23 195 lb (88.5 kg)   06/17/20 188 lb (85.3 kg)   04/30/20 181 lb (82.1 kg)   03/19/20 189 lb (85.7 kg)         Body mass index is 30.9 kg/m².    Physical:  General:  Alert, appropriate, no acute distress   HEENT: supple, no lymphadenopathy   Cardio:  RRR, no murmurs, S1, S2  Pulmonary:  Clear bilaterally, good air entry, no wheezing or crackles  Dermatologic:  Erythematous patches and nodules on bilateral arms and neck      Assessment and Plan: 54 year old female here to follow-up on Type 2 diabetes, hypothyroidism, and asthma.    1. Type 2 diabetes mellitus without complication, without long-term current use of insulin (HCC)    - Very well controlled on Mounjaro 15 mg weekly, and discussed weaning off of Metformin, but she is worried she will lose her insurance soon  - Referral to diabetes navigator sent to offer assistance programs to help with medication coverage while she does not have insurance  - Also printed referral to ophthalmologist for annual eye exam  - Declines statin at this time  - Follow-up in 3-6 months   - Diabetes Navigator    2. Hypothyroidism, unspecified type    - Repeat TSH in 1-2 weeks after going back up to 100 mcg daily of Levothyroxine, but does feel a lot better  - TSH W REFLEX TO FREE T4 [19050][Q]    3. Persistent asthma without complication, unspecified asthma severity (HCC)    - Stable, and continue QVar and prn Albuterol as well as control of allergies with Flonase or Allegra      Clair Sauceda DO  07/02/24  8:06 AM

## 2024-07-03 DIAGNOSIS — E11.9 TYPE 2 DIABETES MELLITUS WITHOUT COMPLICATION, WITHOUT LONG-TERM CURRENT USE OF INSULIN (HCC): ICD-10-CM

## 2024-07-05 RX ORDER — TIRZEPATIDE 15 MG/.5ML
1.5 INJECTION, SOLUTION SUBCUTANEOUS WEEKLY
Qty: 6 ML | Refills: 3 | Status: SHIPPED | OUTPATIENT
Start: 2024-07-05

## 2024-07-05 NOTE — TELEPHONE ENCOUNTER
Refill passed per [facility] protocol.  Requested Prescriptions   Pending Prescriptions Disp Refills    MOUNJARO 15 MG/0.5ML Subcutaneous Solution Pen-injector [Pharmacy Med Name: MOUNJARO PEN 15MG/0.5ML] 6 mL 3     Sig: INJECT THE CONTENTS OF ONE PEN  SUBCUTANEOUSLY WEEKLY AS  DIRECTED       Diabetes Medication Protocol Passed - 7/3/2024  4:43 PM        Passed - Last A1C < 7.5 and within past 6 months     Lab Results   Component Value Date    A1C 6.2 (H) 05/17/2024             Passed - In person appointment or virtual visit in the past 6 mos or appointment in next 3 mos     Recent Outpatient Visits              3 days ago Type 2 diabetes mellitus without complication, without long-term current use of insulin ([facility])    [facility] Clair Sauceda DO    Office Visit    3 months ago Obesity (BMI 30-39.9)    [facility] Clair Sauceda DO    Office Visit    6 months ago Encounter for routine adult health examination without abnormal findings    [facility] Clair Sauceda DO    Office Visit    4 years ago Pre-diabetes    [facility]    Office Visit    4 years ago Increased appetite    [facility]    Virtual Phone E/M          Future Appointments         Provider Department Appt Notes    In 3 months Clair Sauceda DO [facility]                     Passed - Microalbumin procedure in past 12 months or taking ACE/ARB        Passed - EGFRCR or GFRNAA > 50     GFR Evaluation  EGFRCR: 74 , resulted on 5/17/2024          Passed - GFR in the past 12 months           Recent Outpatient Visits              3 days ago Type 2 diabetes mellitus without complication, without long-term current use of insulin (HCC)    [facility]  [place] Clair Sauceda DO    Office Visit    3 months ago Obesity (BMI 30-39.9)    [facility] Clair Sauceda DO    Office Visit    6 months ago Encounter for routine adult health examination without abnormal findings    [facility] Clair Sauceda DO    Office Visit    4 years ago Pre-diabetes    [facility]    Office Visit    4 years ago Increased appetite    [facility]    Virtual Phone E/M          Future Appointments         Provider Department Appt Notes    In 3 months Clair Sauceda DO [facility]

## 2024-07-11 ENCOUNTER — TELEPHONE (OUTPATIENT)
Dept: ENDOCRINOLOGY | Facility: HOSPITAL | Age: 54
End: 2024-07-11

## 2024-07-11 NOTE — TELEPHONE ENCOUNTER
Noted and agree with plan to continue Mounjaro for 3 more months and then switch to Ozempic through the [employer] [facility].

## 2024-07-11 NOTE — TELEPHONE ENCOUNTER
Consent Verification   Assessment completed with: Patient   HIPPA verified? Yes    General: Introduction of Diabetes Navigator services was done. Contact information given to patient.     Lab Results   Component Value Date    A1C 6.2 (H) 05/17/2024    A1C 6.6 05/17/2023      Medication Adherence: Navigator reviewed current medications. Per patient has been adherent with prescribed medications as directed. No side effects or concerns. Patient states she might soon lose her medical insurance. Navigator reviewed the patient assistance programs. Patient agreed to call navigator once she is ready to apply for the program. Navigator called pharmacy to obtain status on Mounjaro. Per pharmacy staff next refill will be process on July 22nd, 3-month supply with a co-pay of $188. Navigator encourage patient to call with any questions or concern. Patient verbalized understanding all questions answered.     Monitoring: Navigator explained the importance to check blood glucose and maintain a daily log.     Appointments:  PCP: 10/22/24 9:30am Dr. Sauceda    Plan: PAP follow-up

## 2024-07-20 LAB — TSH W/REFLEX TO FT4: 1.67 MIU/L

## 2024-07-21 DIAGNOSIS — E03.9 HYPOTHYROIDISM, UNSPECIFIED TYPE: ICD-10-CM

## 2024-07-21 RX ORDER — LEVOTHYROXINE SODIUM 0.1 MG/1
100 TABLET ORAL
Qty: 90 TABLET | Refills: 2 | Status: SHIPPED | OUTPATIENT
Start: 2024-07-21

## 2024-07-23 ENCOUNTER — TELEPHONE (OUTPATIENT)
Facility: CLINIC | Age: 54
End: 2024-07-23

## 2024-07-23 DIAGNOSIS — E03.9 HYPOTHYROIDISM, UNSPECIFIED TYPE: ICD-10-CM

## 2024-07-23 RX ORDER — LEVOTHYROXINE SODIUM 0.1 MG/1
100 TABLET ORAL
Qty: 90 TABLET | Refills: 2 | Status: SHIPPED | OUTPATIENT
Start: 2024-07-23

## 2024-07-23 NOTE — TELEPHONE ENCOUNTER
Patient having trouble logging into her [employer]t  Calling for results.   Reviewed below   Patient contacted (name and date of birth verified). Provider's results and recommendations reviewed with patient. Patient verbalizes understanding of the information, agrees with plan of care and offers no further questions at this time.     Hi Lyssa,     Your thyroid level looked great at the 100 mcg daily dose of Levothyroxine. I have sent in refills to your pharmacy so you have enough, but please let me know if you have any questions.     Dr. Sauceda   Written by Clair Sauceda DO on 7/21/2024  9:58 PM CDT    Patient asking for RX for levothyroxine be sent to mail order instead of local [facility]   Canceled RX at [facility] and sent to mail order.   Patient has 2 months left at home, will call mail order when ready to fill

## 2024-07-29 ENCOUNTER — TELEPHONE (OUTPATIENT)
Dept: ENDOCRINOLOGY | Facility: HOSPITAL | Age: 54
End: 2024-07-29

## 2024-07-29 DIAGNOSIS — J45.909 PERSISTENT ASTHMA WITHOUT COMPLICATION, UNSPECIFIED ASTHMA SEVERITY (HCC): Primary | ICD-10-CM

## 2024-07-29 NOTE — TELEPHONE ENCOUNTER
Condition update: Navigator called [facility] pharmacy to obtain refill status on Mounjaro. Per representative instruction clarification needed, instruction stated to inject 1.5mg on a 15mg dose pen. Per Dr. Sauceda recent OV patient is to inject 15mg once a week. Navigator clarify instructions prescription will get process for a 3-month supply. Navigator called patient to inform of current status, patient verbalized understanding. Per patient would like a refill sent to [facility] pharmacy on her Qvar inhaler 80mg. Message will be sent to Dr. Sauceda.

## 2024-07-29 NOTE — TELEPHONE ENCOUNTER
Covering for Dr Sauceda. Refilled QVAR. Thank you for pending    Jessica Moyer MD, 07/29/24, 12:06 PM

## 2024-10-22 ENCOUNTER — OFFICE VISIT (OUTPATIENT)
Facility: CLINIC | Age: 54
End: 2024-10-22

## 2024-10-22 VITALS
BODY MASS INDEX: 30.05 KG/M2 | HEIGHT: 64 IN | OXYGEN SATURATION: 100 % | HEART RATE: 96 BPM | WEIGHT: 176 LBS | SYSTOLIC BLOOD PRESSURE: 120 MMHG | DIASTOLIC BLOOD PRESSURE: 78 MMHG

## 2024-10-22 DIAGNOSIS — J45.909 PERSISTENT ASTHMA WITHOUT COMPLICATION, UNSPECIFIED ASTHMA SEVERITY (HCC): ICD-10-CM

## 2024-10-22 DIAGNOSIS — L65.9 HAIR THINNING: ICD-10-CM

## 2024-10-22 DIAGNOSIS — E03.9 HYPOTHYROIDISM, UNSPECIFIED TYPE: ICD-10-CM

## 2024-10-22 DIAGNOSIS — Z12.31 VISIT FOR SCREENING MAMMOGRAM: ICD-10-CM

## 2024-10-22 DIAGNOSIS — E11.9 TYPE 2 DIABETES MELLITUS WITHOUT COMPLICATION, WITHOUT LONG-TERM CURRENT USE OF INSULIN (HCC): Primary | ICD-10-CM

## 2024-10-22 PROBLEM — R63.5 WEIGHT GAIN: Status: RESOLVED | Noted: 2020-03-19 | Resolved: 2024-10-22

## 2024-10-22 PROCEDURE — 99213 OFFICE O/P EST LOW 20 MIN: CPT | Performed by: FAMILY MEDICINE

## 2024-10-22 NOTE — PROGRESS NOTES
CC:    Chief Complaint   Patient presents with    Follow - Up       HPI: 54 year old female here for follow-up visit.  Lost her job recently, and currently looking for a new job.  She does not have insurance at this time, but hoping to get new insurance soon.   She was able to get another three month supply of Mounjaro 15 mg before she lost insurance.   She denies any significant side effects with the Mounjaro outside of itching and irritation at the injection site.   She does get constipation or diarrhea occasionally.   She also has her inhalers, but may need her Levothyroxine refilled soon.   Does not feel her energy is always very good, and also has a lot of stress with losing her job and insurance coverage.   Will plan to get her diabetes eye exam when she gets insurance again.   Does take Qvar 1 puff twice daily, and has not had any recent asthma flare-ups.   Has not had an Albuterol in awhile as she will either drink coffee or eat dark chocolate if needed for similar effects as Albuterol makes her jittery.   Has noticed her hair is thinning a lot more, and her scalp was itchier in the summer. She does have a shampoo and liquid Minoxidil she will use intermittently.   Also using a hair re-growth shampoo.   Does try to eat plenty of protein in her diet.    ROS:  General:  No fever, fatigue, intentional weight loss  HEENT:  Denies congestion or nasal discharge, no vision changes   Cardio:  No chest pain  Pulmonary:  No cough, no SOB  GI:  No N/V, intermittent constipation or diarrhea   :  No discharge, no dysuria, no polyuria, no hematuria  Dermatologic:  No rashes, hair thinning  Neuro: no neuropathy     Past Medical History:    Anxiety    Asthma (HCC)    Hypothyroidism    Obesity (BMI 30-39.9)    Pre-diabetes    Vitamin D deficiency       Social History     Socioeconomic History    Marital status:      Spouse name: Not on file    Number of children: Not on file    Years of education: Not on file     Highest education level: Not on file   Occupational History    Not on file   Tobacco Use    Smoking status: Never    Smokeless tobacco: Never   Vaping Use    Vaping status: Never Used   Substance and Sexual Activity    Alcohol use: Not Currently    Drug use: Never    Sexual activity: Not Currently     Partners: Male   Other Topics Concern    Not on file   Social History Narrative    Not on file     Social Drivers of Health     Financial Resource Strain: Not on file   Food Insecurity: Not on file   Transportation Needs: Not on file   Physical Activity: Not on file   Stress: Not on file   Social Connections: Not on file   Housing Stability: Not on file       Current Outpatient Medications   Medication Sig Dispense Refill    Beclomethasone Diprop HFA 80 MCG/ACT Inhalation Aerosol, Breath Activated Inhale 1 puff into the lungs in the morning and 1 puff before bedtime. 10.6 g 2    levothyroxine 100 MCG Oral Tab Take 1 tablet (100 mcg total) by mouth before breakfast. On empty stomach 30-60 minutes before eating 90 tablet 2    Tirzepatide (MOUNJARO) 15 MG/0.5ML Subcutaneous Solution Pen-injector Inject 1.5 mg into the skin once a week. 6 mL 3    fexofenadine 180 MG Oral Tab Take 1 tablet (180 mg total) by mouth daily.      triamcinolone 0.1 % External Ointment Apply 1 Application topically 2 (two) times daily. Use for twice daily x 2 weeks to red, itchy bumps and then stop use for one week. 80 g 1       Aspirin      Vitals:   Vitals:    10/22/24 0918   BP: 120/78   Pulse: 96   SpO2: 100%   Weight: 176 lb (79.8 kg)   Height: 5' 4\" (1.626 m)       Body mass index is 30.21 kg/m².    Physical:  General:  Alert, appropriate, no acute distress   HEENT: supple, no lymphadenopathy   Cardio:  RRR, no murmurs, S1, S2  Pulmonary:  Clear bilaterally, good air entry  Dermatologic:  No rashes or lesions  Bilateral barefoot skin diabetic exam is normal, visualized feet and the appearance is normal.  Bilateral monofilament/sensation of  both feet is normal.  Pulsation pedal pulse exam of both lower legs/feet is normal as well.        Assessment and Plan: 54-year-old female here to follow-up on type 2 diabetes, asthma, and hypothyroidism.    1. Type 2 diabetes mellitus without complication, without long-term current use of insulin (HCC)    -Well-controlled with Mounjaro 15 mg weekly, and will plan to check A1c in the next month once she has insurance  - Also overdue for diabetic eye exam, and will call to schedule soon  - Follow-up in 3 months    2. Persistent asthma without complication, unspecified asthma severity (HCC)    -Very well-controlled with normal asthma control test  - Continue Qvar as directed, and asthma action plan updated    3. Hair thinning    -Chronic and likely multifactorial, but will repeat TSH and check iron studies when she next completes labs  - Also to continue minoxidil and topical treatments    4. Hypothyroidism, unspecified type    -Recent TSH normal, and will continue levothyroxine 100 mcg daily    5. Visit for screening mammogram    - [facility] 2D+3D SCREENING BILAT (CPT=[id number]); Future      Clair Sauceda DO  10/22/24  9:30 AM

## 2025-01-07 ENCOUNTER — TELEPHONE (OUTPATIENT)
Facility: CLINIC | Age: 55
End: 2025-01-07

## 2025-01-07 DIAGNOSIS — E03.9 HYPOTHYROIDISM, UNSPECIFIED TYPE: ICD-10-CM

## 2025-01-07 RX ORDER — LEVOTHYROXINE SODIUM 100 UG/1
100 TABLET ORAL
Qty: 90 TABLET | Refills: 0 | Status: SHIPPED | OUTPATIENT
Start: 2025-01-07

## 2025-01-07 NOTE — TELEPHONE ENCOUNTER
Transferring remaining refills to alternative pharmacy per pt's request.    Medication: Levothyroxine 100 mcg     Original written date: 7/23/24  Quantity: #90  Additional Refills: 2  Prescribing provider: Dr. Sauceda  Pharmacy: [facility]    Has patient filled from original prescription?  [] YES      [] NO    [x] UNKNOWN    Remaining: No dispense history in the last 6 months. Prescription good only until 4/20/24.  Quantity: #90  Additional Refills: 0  Request Pharmacy: [facility] in [place]    Requested Prescriptions     Pending Prescriptions Disp Refills    levothyroxine 100 MCG Oral Tab 90 tablet 0     Sig: Take 1 tablet (100 mcg total) by mouth before breakfast. On empty stomach 30-60 minutes before eating

## 2025-01-07 NOTE — TELEPHONE ENCOUNTER
Incoming call from patient requesting a refill for levothyroxine 100 MCG Oral Tab .    Please send RX to   [id number] - [place], [place] - [address], [phone], [phone] [address] [address]   Phone: [phone] Fax: [phone]   Hours: Not open 24 hours

## 2025-01-10 NOTE — TELEPHONE ENCOUNTER
Verified name and .    Patient calling to follow up on request- she was advised that [facility] [place]:      Disp Refills Start End     levothyroxine 100 MCG Oral Tab 90 tablet 0 2025 --    Sig - Route: Take 1 tablet (100 mcg total) by mouth before breakfast. On empty stomach 30-60 minutes before eating - Oral    Sent to pharmacy as: Levothyroxine Sodium 100 MCG Oral Tablet (Synthroid)    Notes to Pharmacy: Instead of 75 mcg daily    E-Prescribing Status: Receipt confirmed by pharmacy (2025  1:59 PM CST)      Associated Diagnoses    Hypothyroidism, unspecified type        Pharmacy    [id number] - [facility] [address], [phone], [phone]

## 2025-01-29 ENCOUNTER — TELEPHONE (OUTPATIENT)
Facility: CLINIC | Age: 55
End: 2025-01-29

## 2025-01-29 NOTE — TELEPHONE ENCOUNTER
Verified name and .    Patient states that she has been trying to reach [facility] for weeks- has left multiple voicemail messages on [phone] with no response.    She states that she was working with Selena regarding how to obtain Mounjaro without insurance.    She is asking that message be sent with high priority and is requesting that Selena Gaitan call her back as soon as possible.

## 2025-03-29 ENCOUNTER — TELEPHONE (OUTPATIENT)
Facility: CLINIC | Age: 55
End: 2025-03-29

## 2025-06-10 ENCOUNTER — TELEPHONE (OUTPATIENT)
Dept: ENDOCRINOLOGY | Facility: HOSPITAL | Age: 55
End: 2025-06-10

## 2025-06-10 NOTE — TELEPHONE ENCOUNTER
Condition update: Diabetes navigator called the [facility] to review the next Ozempic refill. Per representative prescription was process on 25 and will arrive within 10-14 business days.    Navigator called patient (verify ) to explain status. Patient states continue to be adherent with prescribed medications as directed. Patient also states insurance continues to be a barrier. Navigator reviewed the [email or web address] financial assistance program. Patient would like to apply for the assistance as she is due to follow up with Dr. Sauceda. Navigator encourage patient to call with any questions or concerns. Patient verbalized understanding all questions answered.

## 2025-06-26 ENCOUNTER — TELEPHONE (OUTPATIENT)
Dept: ENDOCRINOLOGY | Facility: HOSPITAL | Age: 55
End: 2025-06-26

## 2025-06-26 NOTE — TELEPHONE ENCOUNTER
Condition update: Providers office received Ozempic injections from the [facility] [facility]. Navigator tried to contact the patient, TRIP to return the call [phone].

## 2025-07-03 DIAGNOSIS — E03.9 HYPOTHYROIDISM, UNSPECIFIED TYPE: ICD-10-CM

## 2025-07-08 RX ORDER — LEVOTHYROXINE SODIUM 100 UG/1
100 TABLET ORAL
Qty: 90 TABLET | Refills: 1 | OUTPATIENT
Start: 2025-07-08

## 2025-07-08 NOTE — TELEPHONE ENCOUNTER
Duplicate request, previously addressed.    6 month e-rx sent Receipt confirmed by pharmacy (4/14/2025  3:39 PM)

## 2025-07-12 ENCOUNTER — TELEPHONE (OUTPATIENT)
Facility: CLINIC | Age: 55
End: 2025-07-12

## 2025-07-17 DIAGNOSIS — J45.909 PERSISTENT ASTHMA WITHOUT COMPLICATION, UNSPECIFIED ASTHMA SEVERITY (HCC): ICD-10-CM

## 2025-07-22 NOTE — TELEPHONE ENCOUNTER
Refill passed per [facility] protocol.  Requested Prescriptions   Pending Prescriptions Disp Refills    Beclomethasone Diprop HFA 80 MCG/ACT Inhalation Aerosol, Breath Activated 10.6 g 2     Sig: Inhale 1 puff into the lungs in the morning and 1 puff before bedtime.       Asthma & COPD Medication Protocol Passed - 7/22/2025  5:30 AM        Passed - ACT Score greater than or equal to 20     25 (10/22/2024  9:42 AM)            Passed - Appointment in past 6 or next 3 months      Recent Outpatient Visits              9 months ago Type 2 diabetes mellitus without complication, without long-term current use of insulin ([facility])    [facility] Clair Sauceda,     Office Visit    1 year ago Type 2 diabetes mellitus without complication, without long-term current use of insulin ([facility])    [facility] Clair Sauceda,     Office Visit    1 year ago Obesity (BMI 30-39.9)    [facility] Clair Sauceda,     Office Visit    1 year ago Encounter for routine adult health examination without abnormal findings    [facility] Clair Sauceda,     Office Visit    5 years ago Pre-diabetes    [facility] Marshal Cano MD    Office Visit          Future Appointments         Provider Department Appt Notes    In 1 month Clair Sauceda DO [facility] [place] physical   pt  greee to be self pay -JR 1/7                    Passed - ACT recorded in the last 12 months     25 (10/22/2024  9:42 AM)            Passed - Medication is active on med list

## (undated) NOTE — LETTER
ASTHMA ACTION PLAN for Lyssa Badillo     : 4/15/1970     Date: 10/22/24  Doctor:  Clair Sauceda DO  Phone for doctor or clinic: [facility], [address] [address] [phone]      ACT Score: 25    ACT Goal: 20 or greater    Call your provider if you require your rescue/quick reliever medication more than 2-3 times in a 24 hour period.    If you require your rescue inhaler/medication more than 2-3 times weekly, your asthma may not be under proper control and you should seek medical attention.    *Quick Relievers are Xopenex and Albuterol*    You can use the colors of a traffic light to help learn about your asthma medicines.  Therapy Range       1. Green - Go! % of Personal Best Peak Flow   Use controller medicine.   Breathing is good  No cough or wheeze  Can work and play Medicine How much to take When to take it    Medications       Steroid Inhalants Instructions     Beclomethasone Diprop HFA 80 MCG/ACT Inhalation Aerosol, Breath Activated Inhale 1 puff into the lungs in the morning and 1 puff before bedtime.                    2. Yellow - Caution. 50-79% Personal Best Peak Flow  Use reliever medicine to keep an asthma attack from getting bad.   Cough  Quick Relievers  Wheezing  Tight Chest  Wake up at night Medicine How much to take When to take it    If symptoms are not improving in 24-48 hrs, call office for further instructions  Medications       Steroid Inhalants Instructions     Beclomethasone Diprop HFA 80 MCG/ACT Inhalation Aerosol, Breath Activated Inhale 1 puff into the lungs in the morning and 1 puff before bedtime.                    3. Red - Stop! Danger! <50% Personal Best Peak Flow  Continue Controller Medications But ADD:   Medicine not helping  Breathing is hard and fast  Nose opens wide  Can't walk  Ribs show  Can't talk well Medicine How much to take When to take it    If your symptoms do not improve in ONE hour -  go  to the emergency room or call 911 immediately! If symptoms improve, call office for appointment immediately.           Don't forget:  Rinse mouth after using inhaler  Use spacer for inhaler  Remember to get your Flu vaccine every fall!    [x] Asthma Action Plan reviewed with the caregiver and patient, and a copy of the plan was given to the patient/caregiver.   [] Asthma Action Plan reviewed with the caregiver and patient on the phone, and copy mailed to patient/caregiver or sent via [employer].     Signatures:   Provider  Clair Sauceda, DO Patient  Lyssa DONNA Badillo Caretaker

## (undated) NOTE — LETTER
ASTHMA ACTION PLAN for Lyssa Badillo     : 4/15/1970     Date: 24  Doctor:  Clair Sauceda DO  Phone for doctor or clinic: [facility], [address] [address] [phone]           ACT Goal: 20 or greater    Call your provider if you require your rescue/quick reliever medication more than 2-3 times in a 24 hour period.    If you require your rescue inhaler/medication more than 2-3 times weekly, your asthma may not be under proper control and you should seek medical attention.    *Quick Relievers are Xopenex and Albuterol*    You can use the colors of a traffic light to help learn about your asthma medicines.  Seasonal       1. Green - Go! % of Personal Best Peak Flow   Use controller medicine.   Breathing is good  No cough or wheeze  Can work and play Medicine How much to take When to take it    Medications       Steroid Inhalants Instructions     Beclomethasone Diprop HFA 80 MCG/ACT Inhalation Aerosol, Breath Activated Inhale 1 puff into the lungs in the morning and 1 puff before bedtime.                    2. Yellow - Caution. 50-79% Personal Best Peak Flow  Use reliever medicine to keep an asthma attack from getting bad.   Cough  Quick Relievers  Wheezing  Tight Chest  Wake up at night Medicine How much to take When to take it    If symptoms are not improving in 24-48 hrs, call office for further instructions  Medications       Steroid Inhalants Instructions     Beclomethasone Diprop HFA 80 MCG/ACT Inhalation Aerosol, Breath Activated Inhale 1 puff into the lungs in the morning and 1 puff before bedtime.                    3. Red - Stop! Danger! <50% Personal Best Peak Flow  Continue Controller Medications But ADD:   Medicine not helping  Breathing is hard and fast  Nose opens wide  Can't walk  Ribs show  Can't talk well Medicine How much to take When to take it    If your symptoms do not improve in ONE hour -  go to the emergency  room or call 911 immediately! If symptoms improve, call office for appointment immediately.    Albuterol inhaler 2 puffs every 20 minutes for three treatments       Don't forget:  Rinse mouth after using inhaler  Use spacer for inhaler  Remember to get your Flu vaccine every fall!    [x] Asthma Action Plan reviewed with the caregiver and patient, and a copy of the plan was given to the patient/caregiver.   [] Asthma Action Plan reviewed with the caregiver and patient on the phone, and copy mailed to patient/caregiver or sent via [employer].     Signatures:   Provider  Clair Sauceda, DO Patient  Lyssa Badillo Caretaker